# Patient Record
Sex: MALE | Race: WHITE | NOT HISPANIC OR LATINO | URBAN - METROPOLITAN AREA
[De-identification: names, ages, dates, MRNs, and addresses within clinical notes are randomized per-mention and may not be internally consistent; named-entity substitution may affect disease eponyms.]

---

## 2018-01-11 NOTE — PROGRESS NOTES
Assessment    1  Encounter for preventive health examination (V70 0) (Z00 00)    Plan  Health Maintenance    · (1) RUBELLA IMMUNITY; Status:Active; Requested for:[id number];    · (1) RUBEOLA ANTIBODIES, IGG; Status:Active; Requested for:[id number];    · PPD; INJECT 0 1  ML Intradermal; To Be Done: [id number]    Discussion/Summary  Impression: health maintenance visit, healthy adult male  Currently, he eats a healthy diet and has an adequate exercise regimen  The risks and benefits of immunizations were discussed  Advice and education were given regarding nutrition and aerobic exercise  Labs drawn for rubella and rubeola titers  PPD skin test applied  Patient to return to the office in 48-72 hours to read the PPD skin test  Patient needs a second PPD skin test in 2 weeks  Chief Complaint  school PE      History of Present Illness  , Adult Male: The patient is being seen for a health maintenance evaluation  The last health maintenance visit was 4 year(s) ago  General Health: The patient's health since the last visit is described as good  He has regular dental visits  He denies vision problems  Vision care includes wearing glasses and wearing soft contact lenses  He denies hearing loss  Immunizations status: up to date  Lifestyle:  He consumes a diverse and healthy diet  He does not have any weight concerns  He exercises regularly  He does not use tobacco  He consumes alcohol  He reports occasional alcohol use  He denies drug use  Screening:   HPI: Patient is here for physical exam for [facility] [employer]  Patient has been feeling well overall without complaints  Social History    · Never A Smoker    Current Meds   1  No Reported Medications Recorded    Allergies    1  No Known Drug Allergies    Vitals   ** Printed in Appendix #1 below  Physical Exam    Constitutional   General appearance: No acute distress, well appearing and well nourished  Eyes   Conjunctiva and lids: No swelling, erythema, or discharge  Pupils and irises: Equal, round and reactive to light  EOMI  Ears, Nose, Mouth, and Throat   External inspection of ears and nose: Normal     Otoscopic examination: Tympanic membrance translucent with normal light reflex  Canals patent without erythema  Oropharynx: Normal with no erythema, edema, exudate or lesions  Pulmonary   Respiratory effort: No increased work of breathing or signs of respiratory distress  Auscultation of lungs: Clear to auscultation  Cardiovascular   Auscultation of heart: Normal rate and rhythm, normal S1 and S2, without murmurs  Examination of extremities for edema and/or varicosities: Normal     Abdomen   Abdomen: Non-tender, no masses  Lymphatic   Palpation of lymph nodes in neck: No lymphadenopathy  Musculoskeletal   Gait and station: Normal     Inspection/palpation of joints, bones, and muscles: Normal     Skin   Skin and subcutaneous tissue: Normal without rashes or lesions      Psychiatric   Orientation to person, place and time: Normal     Mood and affect: Normal        Signatures   Electronically signed by : Fani Martinez DO; 2016  4:43PM EST                       (Author)    Appendix #1     Patient: Ranjan Conde; : 1990; MRN: [id number]      Recorded: [id number] 04:36PM Recorded: [id number] 04:03PM Recorded: [id number] 22:33AM   Systolic  960    Diastolic  70    Heart Rate 64     Respiration 12     Temperature   98 1 F   Height   5 ft 6 5 in   Weight   141 lb    BMI Calculated   22 42   BSA Calculated   1 73

## 2018-01-13 NOTE — PROGRESS NOTES
Chief Complaint  Mantoux L forearm 0mm      Current Meds   1  No Reported Medications Recorded    Allergies    1   No Known Drug Allergies    Plan  Health Maintenance    · MMR    Future Appointments    Date/Time Provider Specialty Site   08/02/2016 12:00 PM Kelly Sheikh, Nurse Schedule  [facility] [address]    Electronically signed by : Felisa Potter, ; Jul 20 2016  5:00PM EST                       (Author)    Electronically signed by : Valeria Gomez DO; Jul 20 2016  5:03PM EST                       (Author)

## 2018-01-13 NOTE — RESULT NOTES
Message   QuantiFERON gold testing is normal      Verified Results  (Q) QUANTIFERON(R)-TB GOLD, (CLIENT INCUBATED) [id number] 10:00AM Rosalia Carreno   REPORT COMMENT:  FASTING:NO     Test Name Result Flag Reference   QUANTIFERON(R)-TB GOLD,$(INCUBATED) NEGATIVE  NEGATIVE   Negative test result  M  tuberculosis complex   infection unlikely  NIL 0 04 IU/mL     MITOGEN-NIL >10 00 IU/mL     TB-NIL <0 00 IU/mL     The Nil tube value is used to determine if the patient  has a preexisting immune response which could cause a   false-positive reading on the test  In order for a   test to be valid, the Nil tube must have a value of   less than or equal to 8 0 IU/mL  The mitogen control tube is used to assure the patient   has a healthy immune status and also serves as a   control for correct blood handling and incubation  It   is used to detect false-negative readings  The mitogen   tube must have a gamma interferon value of greater   than or equal to 0 5 IU/mL higher than the value of   the Nil tube  The TB antigen tube is coated with the M  tuberculosis  specific antigens  For a test to be considered   positive, the TB antigen tube value minus the Nil tube   value must be greater than or equal to 0 35 IU/mL  For additional information, please refer to   [email or web address]  [email or web address]  (This link is being provided for informational/  educational purposes only )

## 2018-01-14 NOTE — RESULT NOTES
Verified Results  (1) RUBELLA IMMUN[facility] [id number] 12:00AM [place] [facility]     Test Name Result Flag Reference   Rubella Antibodies, IgG <0 90 index L Immune >0 99   Non-immune       <0 90                                                 Equivocal  0 90 - 0 99                                                 Immune           >0 99     (1) RUBEOLA ANTIBODIES, IGG [id number] 12:00AM [employer]     Test Name Result Flag Reference   Rubeola Ab, IgG 28 3 AU/mL L Immune >29 9   A second sample should be collected and tested no less than 2-4 weeks  Negative        <25 0                                                  Equivocal 25 0 - 29 9                                                  Positive        >29 9                 Presence of antibodies to Rubeola is presumptive evidence                 of immunity except when acute infection is suspected  Plan  Health Maintenance    · MMR; INJECT 0 5  ML Subcutaneous; To Be Done: [id number]    Discussion/Summary   Rubella and rubeola antibody titers are decreased consistent with not immune  Therefore recommend MMR booster immunization

## 2020-10-16 ENCOUNTER — TELEPHONE (OUTPATIENT)
Dept: FAMILY MEDICINE CLINIC | Facility: CLINIC | Age: 30
End: 2020-10-16